# Patient Record
Sex: FEMALE | Race: WHITE | Employment: FULL TIME | URBAN - METROPOLITAN AREA
[De-identification: names, ages, dates, MRNs, and addresses within clinical notes are randomized per-mention and may not be internally consistent; named-entity substitution may affect disease eponyms.]

---

## 2024-01-03 LAB
ABO, EXTERNAL RESULT: NORMAL
C. TRACHOMATIS, EXTERNAL RESULT: NEGATIVE
HEP B, EXTERNAL RESULT: NEGATIVE
HIV, EXTERNAL RESULT: NON REACTIVE
N. GONORRHOEAE, EXTERNAL RESULT: NEGATIVE
RPR, EXTERNAL RESULT: NON REACTIVE
RUBELLA TITER, EXTERNAL RESULT: NORMAL

## 2024-01-04 LAB — GBS, EXTERNAL RESULT: POSITIVE

## 2024-01-21 ENCOUNTER — HOSPITAL ENCOUNTER (INPATIENT)
Facility: HOSPITAL | Age: 24
LOS: 4 days | Discharge: HOME | End: 2024-01-25
Attending: STUDENT IN AN ORGANIZED HEALTH CARE EDUCATION/TRAINING PROGRAM | Admitting: OBSTETRICS & GYNECOLOGY

## 2024-01-21 PROBLEM — Z3A.39 39 WEEKS GESTATION OF PREGNANCY: Status: ACTIVE | Noted: 2024-01-21

## 2024-01-21 LAB
ERYTHROCYTE [DISTWIDTH] IN BLOOD BY AUTOMATED COUNT: 12.3 % (ref 11.5–14.5)
HCT VFR BLD AUTO: 38 % (ref 35–47)
HGB BLD-MCNC: 12.7 G/DL (ref 11.5–16)
MCH RBC QN AUTO: 30.6 PG (ref 26–34)
MCHC RBC AUTO-ENTMCNC: 33.4 G/DL (ref 30–36.5)
MCV RBC AUTO: 91.6 FL (ref 80–99)
NRBC # BLD: 0 K/UL (ref 0–0.01)
NRBC BLD-RTO: 0 PER 100 WBC
PLATELET # BLD AUTO: 206 K/UL (ref 150–400)
PMV BLD AUTO: 9.2 FL (ref 8.9–12.9)
RBC # BLD AUTO: 4.15 M/UL (ref 3.8–5.2)
WBC # BLD AUTO: 11.2 K/UL (ref 3.6–11)

## 2024-01-21 PROCEDURE — 36415 COLL VENOUS BLD VENIPUNCTURE: CPT

## 2024-01-21 PROCEDURE — 1100000000 HC RM PRIVATE

## 2024-01-21 PROCEDURE — 85027 COMPLETE CBC AUTOMATED: CPT

## 2024-01-21 PROCEDURE — 6370000000 HC RX 637 (ALT 250 FOR IP): Performed by: ADVANCED PRACTICE MIDWIFE

## 2024-01-21 PROCEDURE — 7210000100 HC LABOR FEE PER 1 HR

## 2024-01-21 PROCEDURE — C1726 CATH, BAL DIL, NON-VASCULAR: HCPCS

## 2024-01-21 PROCEDURE — 59200 INSERT CERVICAL DILATOR: CPT

## 2024-01-21 RX ORDER — SODIUM CHLORIDE 9 MG/ML
25 INJECTION, SOLUTION INTRAVENOUS PRN
Status: DISCONTINUED | OUTPATIENT
Start: 2024-01-21 | End: 2024-01-23

## 2024-01-21 RX ORDER — CARBOPROST TROMETHAMINE 250 UG/ML
250 INJECTION, SOLUTION INTRAMUSCULAR PRN
Status: DISCONTINUED | OUTPATIENT
Start: 2024-01-21 | End: 2024-01-23

## 2024-01-21 RX ORDER — SODIUM CHLORIDE 0.9 % (FLUSH) 0.9 %
5-40 SYRINGE (ML) INJECTION EVERY 12 HOURS SCHEDULED
Status: DISCONTINUED | OUTPATIENT
Start: 2024-01-21 | End: 2024-01-23

## 2024-01-21 RX ORDER — ZOLPIDEM TARTRATE 5 MG/1
5 TABLET ORAL NIGHTLY PRN
Status: ACTIVE | OUTPATIENT
Start: 2024-01-21 | End: 2024-01-22

## 2024-01-21 RX ORDER — SODIUM CHLORIDE, SODIUM LACTATE, POTASSIUM CHLORIDE, CALCIUM CHLORIDE 600; 310; 30; 20 MG/100ML; MG/100ML; MG/100ML; MG/100ML
INJECTION, SOLUTION INTRAVENOUS CONTINUOUS
Status: DISCONTINUED | OUTPATIENT
Start: 2024-01-21 | End: 2024-01-23

## 2024-01-21 RX ORDER — SODIUM CHLORIDE 0.9 % (FLUSH) 0.9 %
5-40 SYRINGE (ML) INJECTION PRN
Status: DISCONTINUED | OUTPATIENT
Start: 2024-01-21 | End: 2024-01-23

## 2024-01-21 RX ORDER — CALCIUM CARBONATE 500(1250)
500 TABLET ORAL DAILY
Status: ON HOLD | COMMUNITY
End: 2024-01-24 | Stop reason: HOSPADM

## 2024-01-21 RX ORDER — METHYLERGONOVINE MALEATE 0.2 MG/ML
200 INJECTION INTRAVENOUS PRN
Status: DISCONTINUED | OUTPATIENT
Start: 2024-01-21 | End: 2024-01-23

## 2024-01-21 RX ORDER — ACETAMINOPHEN 325 MG/1
650 TABLET ORAL EVERY 4 HOURS PRN
Status: DISCONTINUED | OUTPATIENT
Start: 2024-01-21 | End: 2024-01-23

## 2024-01-21 RX ORDER — SODIUM CHLORIDE, SODIUM LACTATE, POTASSIUM CHLORIDE, AND CALCIUM CHLORIDE .6; .31; .03; .02 G/100ML; G/100ML; G/100ML; G/100ML
500 INJECTION, SOLUTION INTRAVENOUS PRN
Status: DISCONTINUED | OUTPATIENT
Start: 2024-01-21 | End: 2024-01-23

## 2024-01-21 RX ORDER — SEVOFLURANE 250 ML/250ML
1 LIQUID RESPIRATORY (INHALATION) CONTINUOUS PRN
Status: DISCONTINUED | OUTPATIENT
Start: 2024-01-21 | End: 2024-01-23

## 2024-01-21 RX ORDER — TERBUTALINE SULFATE 1 MG/ML
0.25 INJECTION, SOLUTION SUBCUTANEOUS
Status: ACTIVE | OUTPATIENT
Start: 2024-01-21 | End: 2024-01-22

## 2024-01-21 RX ORDER — HYDROMORPHONE HYDROCHLORIDE 1 MG/ML
1 INJECTION, SOLUTION INTRAMUSCULAR; INTRAVENOUS; SUBCUTANEOUS EVERY 4 HOURS PRN
Status: DISCONTINUED | OUTPATIENT
Start: 2024-01-21 | End: 2024-01-23

## 2024-01-21 RX ORDER — MISOPROSTOL 200 UG/1
800 TABLET ORAL PRN
Status: DISCONTINUED | OUTPATIENT
Start: 2024-01-21 | End: 2024-01-23

## 2024-01-21 RX ORDER — SODIUM CHLORIDE, SODIUM LACTATE, POTASSIUM CHLORIDE, AND CALCIUM CHLORIDE .6; .31; .03; .02 G/100ML; G/100ML; G/100ML; G/100ML
1000 INJECTION, SOLUTION INTRAVENOUS PRN
Status: DISCONTINUED | OUTPATIENT
Start: 2024-01-21 | End: 2024-01-23

## 2024-01-21 RX ADMIN — Medication 25 MCG: at 20:23

## 2024-01-21 ASSESSMENT — PAIN DESCRIPTION - LOCATION: LOCATION: ABDOMEN

## 2024-01-21 ASSESSMENT — PAIN DESCRIPTION - DESCRIPTORS: DESCRIPTORS: CRAMPING

## 2024-01-21 ASSESSMENT — PAIN SCALES - GENERAL
PAINLEVEL_OUTOF10: 1
PAINLEVEL_OUTOF10: 0
PAINLEVEL_OUTOF10: 0

## 2024-01-21 NOTE — H&P
positive  Depression- no meds  Transfer from [facility]      Plan:  Admit to L&D for scheduled IOL  IOL process reviewed in office and I also briefly reviewed cook/cytotec/pitocin r/b/a, lengthy IOL process and increased risk of c/s- patient consents to proceed with cook placement and IOL  Review and sign consents  CBC, T&S  Cook placed under speculum guidance 60mL (uterine), 60mL (vaginally)  Cytotec 25mcg q4h prn x2, hold if 3 or more contractions in 10 minutes  Pit @0400  IV pain meds/epidural prn  PCN for GBS prophylaxis at 0400 or sooner with labor  Continuous monitoring  Reviewed prenatal records    Reviewed plan with patient/mother, all questions asked/answered and they agree with plan    JOSUE Colbert - MALEAN  6:52 PM

## 2024-01-21 NOTE — PROGRESS NOTES
1702 pt arrived to labor and delivery for elective induction at 39.6 weeks.   1830 Mckenzie at bedside. Plan of care discussed and all questions answered. Cervical ripening balloon placed with ease. Pt tolerated well. Will assess for possible cytotec administration.

## 2024-01-22 ENCOUNTER — ANESTHESIA (OUTPATIENT)
Facility: HOSPITAL | Age: 24
End: 2024-01-22

## 2024-01-22 ENCOUNTER — ANESTHESIA EVENT (OUTPATIENT)
Facility: HOSPITAL | Age: 24
End: 2024-01-22

## 2024-01-22 PROCEDURE — 51701 INSERT BLADDER CATHETER: CPT

## 2024-01-22 PROCEDURE — 6360000002 HC RX W HCPCS: Performed by: ANESTHESIOLOGY

## 2024-01-22 PROCEDURE — 3700000156 HC EPIDURAL ANESTHESIA

## 2024-01-22 PROCEDURE — 2500000003 HC RX 250 WO HCPCS

## 2024-01-22 PROCEDURE — 2580000003 HC RX 258: Performed by: ADVANCED PRACTICE MIDWIFE

## 2024-01-22 PROCEDURE — 6360000002 HC RX W HCPCS: Performed by: ADVANCED PRACTICE MIDWIFE

## 2024-01-22 PROCEDURE — 3700000025 EPIDURAL BLOCK: Performed by: ANESTHESIOLOGY

## 2024-01-22 PROCEDURE — 2500000003 HC RX 250 WO HCPCS: Performed by: ANESTHESIOLOGY

## 2024-01-22 PROCEDURE — 1100000000 HC RM PRIVATE

## 2024-01-22 PROCEDURE — 2580000003 HC RX 258: Performed by: ANESTHESIOLOGY

## 2024-01-22 PROCEDURE — 7210000100 HC LABOR FEE PER 1 HR

## 2024-01-22 RX ORDER — NALOXONE HYDROCHLORIDE 0.4 MG/ML
INJECTION, SOLUTION INTRAMUSCULAR; INTRAVENOUS; SUBCUTANEOUS PRN
Status: DISCONTINUED | OUTPATIENT
Start: 2024-01-22 | End: 2024-01-23

## 2024-01-22 RX ORDER — FENTANYL CITRATE 50 UG/ML
INJECTION, SOLUTION INTRAMUSCULAR; INTRAVENOUS
Status: DISCONTINUED
Start: 2024-01-22 | End: 2024-01-23

## 2024-01-22 RX ORDER — ONDANSETRON 2 MG/ML
4 INJECTION INTRAMUSCULAR; INTRAVENOUS EVERY 6 HOURS PRN
Status: DISCONTINUED | OUTPATIENT
Start: 2024-01-22 | End: 2024-01-23

## 2024-01-22 RX ORDER — LIDOCAINE HCL/EPINEPHRINE/PF 2%-1:200K
VIAL (ML) INJECTION PRN
Status: DISCONTINUED | OUTPATIENT
Start: 2024-01-22 | End: 2024-01-23 | Stop reason: SDUPTHER

## 2024-01-22 RX ORDER — FENTANYL 0.2 MG/100ML-BUPIV 0.125%-NACL 0.9% EPIDURAL INJ 2/0.125 MCG/ML-%
1-15 SOLUTION INJECTION CONTINUOUS
Status: DISCONTINUED | OUTPATIENT
Start: 2024-01-22 | End: 2024-01-23

## 2024-01-22 RX ORDER — DIPHENHYDRAMINE HYDROCHLORIDE 50 MG/ML
12.5 INJECTION INTRAMUSCULAR; INTRAVENOUS EVERY 4 HOURS PRN
Status: DISCONTINUED | OUTPATIENT
Start: 2024-01-22 | End: 2024-01-23

## 2024-01-22 RX ORDER — ONDANSETRON 2 MG/ML
4 INJECTION INTRAMUSCULAR; INTRAVENOUS EVERY 6 HOURS PRN
Status: DISCONTINUED | OUTPATIENT
Start: 2024-01-22 | End: 2024-01-22 | Stop reason: SDUPTHER

## 2024-01-22 RX ORDER — FENTANYL CITRATE 50 UG/ML
INJECTION, SOLUTION INTRAMUSCULAR; INTRAVENOUS PRN
Status: DISCONTINUED | OUTPATIENT
Start: 2024-01-22 | End: 2024-01-23 | Stop reason: SDUPTHER

## 2024-01-22 RX ORDER — EPHEDRINE SULFATE 50 MG/ML
10 INJECTION INTRAVENOUS ONCE
Status: COMPLETED | OUTPATIENT
Start: 2024-01-22 | End: 2024-01-22

## 2024-01-22 RX ORDER — BUPIVACAINE HYDROCHLORIDE 5 MG/ML
INJECTION, SOLUTION EPIDURAL; INTRACAUDAL
Status: COMPLETED
Start: 2024-01-22 | End: 2024-01-22

## 2024-01-22 RX ORDER — EPHEDRINE SULFATE 50 MG/ML
INJECTION INTRAVENOUS
Status: COMPLETED
Start: 2024-01-22 | End: 2024-01-22

## 2024-01-22 RX ORDER — BUPIVACAINE HYDROCHLORIDE 5 MG/ML
INJECTION, SOLUTION EPIDURAL; INTRACAUDAL PRN
Status: DISCONTINUED | OUTPATIENT
Start: 2024-01-22 | End: 2024-01-23 | Stop reason: SDUPTHER

## 2024-01-22 RX ORDER — LIDOCAINE HCL/EPINEPHRINE/PF 2%-1:200K
VIAL (ML) INJECTION
Status: COMPLETED
Start: 2024-01-22 | End: 2024-01-22

## 2024-01-22 RX ADMIN — SODIUM CHLORIDE 5 MILLION UNITS: 900 INJECTION INTRAVENOUS at 04:17

## 2024-01-22 RX ADMIN — FENTANYL CITRATE 10 ML/HR: 0.05 INJECTION, SOLUTION INTRAMUSCULAR; INTRAVENOUS at 18:05

## 2024-01-22 RX ADMIN — PENICILLIN G POTASSIUM 2.5 MILLION UNITS: 20000000 INJECTION, POWDER, FOR SOLUTION INTRAVENOUS at 17:16

## 2024-01-22 RX ADMIN — PENICILLIN G POTASSIUM 2.5 MILLION UNITS: 20000000 INJECTION, POWDER, FOR SOLUTION INTRAVENOUS at 08:30

## 2024-01-22 RX ADMIN — EPHEDRINE SULFATE 10 MG: 50 INJECTION INTRAVENOUS at 19:16

## 2024-01-22 RX ADMIN — SODIUM CHLORIDE, POTASSIUM CHLORIDE, SODIUM LACTATE AND CALCIUM CHLORIDE: 600; 310; 30; 20 INJECTION, SOLUTION INTRAVENOUS at 13:35

## 2024-01-22 RX ADMIN — SODIUM CHLORIDE, POTASSIUM CHLORIDE, SODIUM LACTATE AND CALCIUM CHLORIDE: 600; 310; 30; 20 INJECTION, SOLUTION INTRAVENOUS at 16:33

## 2024-01-22 RX ADMIN — OXYTOCIN 1 MILLI-UNITS/MIN: 10 INJECTION, SOLUTION INTRAMUSCULAR; INTRAVENOUS at 04:24

## 2024-01-22 RX ADMIN — SODIUM CHLORIDE, POTASSIUM CHLORIDE, SODIUM LACTATE AND CALCIUM CHLORIDE: 600; 310; 30; 20 INJECTION, SOLUTION INTRAVENOUS at 17:07

## 2024-01-22 RX ADMIN — ONDANSETRON 4 MG: 2 INJECTION INTRAMUSCULAR; INTRAVENOUS at 00:35

## 2024-01-22 RX ADMIN — PENICILLIN G POTASSIUM 2.5 MILLION UNITS: 20000000 INJECTION, POWDER, FOR SOLUTION INTRAVENOUS at 12:30

## 2024-01-22 RX ADMIN — FENTANYL CITRATE 100 MCG: 50 INJECTION, SOLUTION INTRAMUSCULAR; INTRAVENOUS at 16:59

## 2024-01-22 RX ADMIN — PENICILLIN G POTASSIUM 2.5 MILLION UNITS: 20000000 INJECTION, POWDER, FOR SOLUTION INTRAVENOUS at 22:04

## 2024-01-22 RX ADMIN — LIDOCAINE HYDROCHLORIDE AND EPINEPHRINE 5 ML: 20; 5 INJECTION, SOLUTION EPIDURAL; INFILTRATION; INTRACAUDAL; PERINEURAL at 16:56

## 2024-01-22 RX ADMIN — SODIUM CHLORIDE, POTASSIUM CHLORIDE, SODIUM LACTATE AND CALCIUM CHLORIDE: 600; 310; 30; 20 INJECTION, SOLUTION INTRAVENOUS at 20:24

## 2024-01-22 RX ADMIN — SODIUM CHLORIDE, POTASSIUM CHLORIDE, SODIUM LACTATE AND CALCIUM CHLORIDE: 600; 310; 30; 20 INJECTION, SOLUTION INTRAVENOUS at 04:14

## 2024-01-22 RX ADMIN — BUPIVACAINE HYDROCHLORIDE 5 ML: 5 INJECTION, SOLUTION EPIDURAL; INTRACAUDAL; PERINEURAL at 16:59

## 2024-01-22 ASSESSMENT — PAIN SCALES - GENERAL: PAINLEVEL_OUTOF10: 0

## 2024-01-22 NOTE — PROGRESS NOTES
Labor Progress Note    S: Patient resting comfortably. Removed balloon about 30 minutes ago.      O:   Vitals:    24 0416   BP: (!) 94/49   Pulse: 79   Resp: 16   Temp: 97.9 °F (36.6 °C)   SpO2: 99%      FHT: 145 / moderate / + accels / no decels     [place]: q 2-5 minutes     SVE: deferred     24 yo  at 40w0d admitted for IOL for FGR (14% AC 9% at 38w).     FHT category 1  Sp abbey see   Pitocin at 6   GBS pos, PCN ordered   Will assess for AROM at next check    Tasha Amos MD

## 2024-01-22 NOTE — PROGRESS NOTES
0030 Received OB SBAR Report from BASHIR Ruano RN  0031 Patient called out for nausea   0035 Zofran given  0420 Patient sleeping  0734 Patient up to bathroom, cervical ripening balloon pulled out by patient

## 2024-01-22 NOTE — PROGRESS NOTES
Bedside and Verbal shift change report given to JANICE Lazo RN (oncoming nurse) by JOYCE Paige RN (offgoing nurse). Report included the following information Nurse Handoff Report, MAR, and Recent Results.   1005:  RN at bedside discussing position changes with pt and pt's mom  1258:  Dr Amos at bedside   SVE 4/60/-2  AROM    Pt sitting in throne  1350:  pt ambulating in hallway with mom  1415:  Pt in bed resting  1500:  Knee chest supported with cub in tberg   1550:  Pt sitting on toilet for 5-10 ctx  1603:  Pit off   1607:  Left side lying pelvic release  Right side lying pelvic release   1629:  SVE 5/90/-1  1656:  Dr Roland at bedside for epidural placement  1730  right side with peanut ball   1812:  left side with peanut ball  1925:  Right exaggerated runners  1935:  Bedside and Verbal shift change report given to THANH Ruano RN (oncoming nurse) by JANICE Lazo RN (offgoing nurse). Report included the following information Nurse Handoff Report, Intake/Output, MAR, and Recent Results.

## 2024-01-22 NOTE — ANESTHESIA PRE PROCEDURE
\"LABRH\"    Drug/Infectious Status (If Applicable):  No results found for: \"HIV\", \"HEPCAB\"    COVID-19 Screening (If Applicable): No results found for: \"COVID19\"        Anesthesia Evaluation  Patient summary reviewed and Nursing notes reviewed  Airway: Mallampati: II  TM distance: >3 FB   Neck ROM: full  Mouth opening: > = 3 FB   Dental: normal exam         Pulmonary:Negative Pulmonary ROS and normal exam                               Cardiovascular:Negative CV ROS  Exercise tolerance: good (>4 METS)                    Neuro/Psych:   Negative Neuro/Psych ROS              GI/Hepatic/Renal: Neg GI/Hepatic/Renal ROS            Endo/Other: Negative Endo/Other ROS                    Abdominal: normal exam            Vascular: negative vascular ROS.         Other Findings:             Anesthesia Plan      epidural     ASA 2             Anesthetic plan and risks discussed with patient.        Attending anesthesiologist reviewed and agrees with Preprocedure content                Ilia Roland MD   1/22/2024

## 2024-01-22 NOTE — PROGRESS NOTES
Labor Progress Note    S: Patient resting comfortably     O:   Vitals:    24 1232   BP: 101/63   Pulse: 70   Resp: 14   Temp: 97.5 °F (36.4 °C)   SpO2:       FHT: 140 / moderate / + accels / no decels     [facility]: q 2-4 minutes      SVE: 60/-2, AROM with clear fluid     22 yo  at 40w0d admitted for IOL for FGR (14% AC 9% at 38w)     FHT category 1   AROM with patient consent   GBS pos, continue PCN   Recheck 6hrs or PRN   Pain mgmt per patient request     Tasha Amos MD

## 2024-01-22 NOTE — ANESTHESIA PROCEDURE NOTES
Epidural Block    Patient location during procedure: OB  Start time: 1/22/2024 4:50 PM  End time: 1/22/2024 5:00 PM  Reason for block: labor epidural  Staffing  Performed: anesthesiologist   Anesthesiologist: Ilia Roland MD  Performed by: Ilia Roland MD  Authorized by: Ilia Roland MD    Epidural  Patient position: sitting  Prep: DuraPrep  Patient monitoring: cardiac monitor, continuous pulse ox and frequent blood pressure checks  Approach: midline  Location: L3-4  Injection technique: ZI air  Provider prep: mask and sterile gloves  Needle  Needle type: Tuohy   Needle gauge: 17 G  Needle length: 3.5 in  Needle insertion depth: 4 cm  Catheter type: end hole  Catheter size: 18 G  Catheter at skin depth: 8 cmCatheter Secured: tegaderm and tape  Assessment  Sensory level: T10  Events: None  Hemodynamics: stable  Attempts: 1  Outcomes: uncomplicated and patient tolerated procedure well  Preanesthetic Checklist  Completed: patient identified, IV checked, site marked, risks and benefits discussed, surgical/procedural consents, equipment checked, pre-op evaluation, timeout performed, anesthesia consent given, oxygen available, monitors applied/VS acknowledged and fire risk safety assessment completed and verbalized

## 2024-01-23 PROCEDURE — 7100000000 HC PACU RECOVERY - FIRST 15 MIN

## 2024-01-23 PROCEDURE — 7210000100 HC LABOR FEE PER 1 HR

## 2024-01-23 PROCEDURE — 7100000001 HC PACU RECOVERY - ADDTL 15 MIN

## 2024-01-23 PROCEDURE — 6360000002 HC RX W HCPCS: Performed by: ANESTHESIOLOGY

## 2024-01-23 PROCEDURE — 1120000000 HC RM PRIVATE OB

## 2024-01-23 PROCEDURE — 6360000002 HC RX W HCPCS: Performed by: ADVANCED PRACTICE MIDWIFE

## 2024-01-23 PROCEDURE — 7220000101 HC DELIVERY VAGINAL/SINGLE

## 2024-01-23 PROCEDURE — 2580000003 HC RX 258: Performed by: ANESTHESIOLOGY

## 2024-01-23 PROCEDURE — 6370000000 HC RX 637 (ALT 250 FOR IP): Performed by: MIDWIFE

## 2024-01-23 PROCEDURE — 2580000003 HC RX 258: Performed by: ADVANCED PRACTICE MIDWIFE

## 2024-01-23 RX ORDER — MODIFIED LANOLIN
OINTMENT (GRAM) TOPICAL PRN
Status: DISCONTINUED | OUTPATIENT
Start: 2024-01-23 | End: 2024-01-25 | Stop reason: HOSPADM

## 2024-01-23 RX ORDER — SODIUM CHLORIDE, SODIUM LACTATE, POTASSIUM CHLORIDE, CALCIUM CHLORIDE 600; 310; 30; 20 MG/100ML; MG/100ML; MG/100ML; MG/100ML
INJECTION, SOLUTION INTRAVENOUS CONTINUOUS
Status: DISCONTINUED | OUTPATIENT
Start: 2024-01-23 | End: 2024-01-25 | Stop reason: HOSPADM

## 2024-01-23 RX ORDER — IBUPROFEN 400 MG/1
800 TABLET ORAL EVERY 8 HOURS SCHEDULED
Status: DISCONTINUED | OUTPATIENT
Start: 2024-01-23 | End: 2024-01-25 | Stop reason: HOSPADM

## 2024-01-23 RX ORDER — SODIUM CHLORIDE 0.9 % (FLUSH) 0.9 %
5-40 SYRINGE (ML) INJECTION PRN
Status: DISCONTINUED | OUTPATIENT
Start: 2024-01-23 | End: 2024-01-25 | Stop reason: HOSPADM

## 2024-01-23 RX ORDER — SODIUM CHLORIDE 0.9 % (FLUSH) 0.9 %
5-40 SYRINGE (ML) INJECTION EVERY 12 HOURS SCHEDULED
Status: DISCONTINUED | OUTPATIENT
Start: 2024-01-23 | End: 2024-01-25 | Stop reason: HOSPADM

## 2024-01-23 RX ORDER — DOCUSATE SODIUM 100 MG/1
100 CAPSULE, LIQUID FILLED ORAL 2 TIMES DAILY
Status: DISCONTINUED | OUTPATIENT
Start: 2024-01-23 | End: 2024-01-25 | Stop reason: HOSPADM

## 2024-01-23 RX ORDER — SODIUM CHLORIDE 9 MG/ML
INJECTION, SOLUTION INTRAVENOUS PRN
Status: DISCONTINUED | OUTPATIENT
Start: 2024-01-23 | End: 2024-01-25 | Stop reason: HOSPADM

## 2024-01-23 RX ORDER — ACETAMINOPHEN 500 MG
1000 TABLET ORAL EVERY 8 HOURS SCHEDULED
Status: DISCONTINUED | OUTPATIENT
Start: 2024-01-23 | End: 2024-01-25 | Stop reason: HOSPADM

## 2024-01-23 RX ADMIN — IBUPROFEN 800 MG: 400 TABLET, FILM COATED ORAL at 20:27

## 2024-01-23 RX ADMIN — ACETAMINOPHEN 1000 MG: 500 TABLET ORAL at 20:26

## 2024-01-23 RX ADMIN — OXYTOCIN 1 MILLI-UNITS/MIN: 10 INJECTION, SOLUTION INTRAMUSCULAR; INTRAVENOUS at 02:11

## 2024-01-23 RX ADMIN — Medication 166.7 ML: at 06:23

## 2024-01-23 RX ADMIN — IBUPROFEN 800 MG: 400 TABLET, FILM COATED ORAL at 12:12

## 2024-01-23 RX ADMIN — ACETAMINOPHEN 1000 MG: 500 TABLET ORAL at 12:12

## 2024-01-23 RX ADMIN — PENICILLIN G POTASSIUM 2.5 MILLION UNITS: 20000000 INJECTION, POWDER, FOR SOLUTION INTRAVENOUS at 02:14

## 2024-01-23 RX ADMIN — OXYTOCIN 87.3 MILLI-UNITS/MIN: 10 INJECTION, SOLUTION INTRAMUSCULAR; INTRAVENOUS at 06:33

## 2024-01-23 RX ADMIN — DOCUSATE SODIUM 100 MG: 100 CAPSULE, LIQUID FILLED ORAL at 20:27

## 2024-01-23 RX ADMIN — FENTANYL CITRATE 10 ML/HR: 0.05 INJECTION, SOLUTION INTRAMUSCULAR; INTRAVENOUS at 02:45

## 2024-01-23 RX ADMIN — SODIUM CHLORIDE, POTASSIUM CHLORIDE, SODIUM LACTATE AND CALCIUM CHLORIDE: 600; 310; 30; 20 INJECTION, SOLUTION INTRAVENOUS at 03:37

## 2024-01-23 RX ADMIN — PENICILLIN G POTASSIUM 2.5 MILLION UNITS: 20000000 INJECTION, POWDER, FOR SOLUTION INTRAVENOUS at 05:46

## 2024-01-23 ASSESSMENT — PAIN SCALES - GENERAL: PAINLEVEL_OUTOF10: 0

## 2024-01-23 NOTE — DISCHARGE INSTRUCTIONS
1/24/2024 Time: 11:34 AM      Postpartum Support Groups  We know that all of us are dealing with a tremendous amount of uncertainty, confusion and disruption to our daily lives, which may result in increased anxiety, depression and fear. If you are feeling unsettled or worse, please know that we are here to help. During this time of increased caution and care for one another, [facility] [place] ([facility]) is offering virtual support groups to ALL MOTHERS in [place] regardless of the age of your child/children as a way to help weather this emotional storm together. Social support is an important part of self-care during this time of physical distancing.  Virtual postpartum support group meetings available at www.postpartumva.org  Warm Line: 327.968.6189    Breastfeeding Support Groups   1st and 3rd Wednesday of each month at [place]  2nd and 4th Tuesday of each month at [place]      [email or web address]

## 2024-01-23 NOTE — ANESTHESIA POSTPROCEDURE EVALUATION
Department of Anesthesiology  Postprocedure Note    Patient: Mary Lutz  MRN: [id number]  YOB: 2000  Date of evaluation: 1/23/2024    Procedure Summary       Date: 01/22/24 Room / Location:     Anesthesia Start: 1650 Anesthesia Stop: 01/23/24 0618    Procedure: Labor Analgesia Diagnosis:     Scheduled Providers:  Responsible Provider: Marcus Snell DO    Anesthesia Type: epidural ASA Status: 2            Anesthesia Type: No value filed.    Louisa Phase I:      Louisa Phase II:      Anesthesia Post Evaluation    Patient location during evaluation: bedside  Level of consciousness: awake  Pain score: 0  Airway patency: patent  Nausea & Vomiting: no nausea  Cardiovascular status: hemodynamically stable  Respiratory status: acceptable  Hydration status: euvolemic  Comments: BP: 90/52 Pulse: 70  Resp: 16 SpO2: 99  Temp: 97.9 °F (36.6 °C)      Pain management: adequate    No notable events documented.

## 2024-01-23 NOTE — PROGRESS NOTES
Called by nursing to evaluate EFM tracing. Contractions remain unable to correctly monitor by EFM and questionable variable decels vs. Late decels currently.     VSS    VE: /-2. Vertex  IUPC placed. Straight cath by RN staff.     Assessment: cat II tracing.     Plan: Continue fetal surveillance. Fetal resuscitation maneuvers prn. Consider amnioinfusion if variables persist.   Anticipate

## 2024-01-23 NOTE — PROGRESS NOTES
In room to introduce myself to pt. Resting comfortably with working epidural. Pitocin at 4mu. Mother of pt. At BS.     VSS    FHR 120bpm. Mod gualberto. 15x15 accels. Occasional gualberto decels noted.   Ctx TOCO somewhat indeterminate for periods. Abdomen palpates soft. No tachysystole noted when monitoring Ucs.     Assessment: IUP @ 40w. IOL for suspected IUGR. Cat I Itracing.     Plan: Continue labor induction. VE in next couple hours or if tracing warrants. May consider IUPC

## 2024-01-23 NOTE — PROGRESS NOTES
Amnioinfusion previously initiated and called by nursing that patient's EFM persists with decels. In room to evaluate. Just out of trendelenburg. Moderate amount of blood show. Pitocin has been off for approx. 1hr    VSS    VE: no change. Remains 6/90/-2.     Assessment: persistent Cat II tracing. Intermittent late decelerations    Plan: Lengthy discussion with pt. And her mother on FHR tracing and inability to run pitocin if decels persist, functionality of the placenta, also inability to remain ruptured without labor contractions indefinitely. Discussed tracing with Dr. Brown. Appears to be improvement now. Currently Cat I. Will continue to monitor and hope to be able to resume pitocin

## 2024-01-23 NOTE — L&D DELIVERY NOTE
Delivery of female infant over supported perineum. Vertex delivered with spont. Maternal pushing efforts. No nuchal noted. Anterior shoulder delivered with gentle downward traction followed immediately by post. Shoulder and body. Baby placed on mom's abdomen. Dried and stimulated/bulb suction by RN staff. Delayed cord clamping. Cord clamped by CNM and cut by pt. Infant skin to skin. Repair in typical running fashion for good hemostasis. Kashif placenta del. Intact. Mom and baby stable and attended after sharif care      KIT Lutz [[id number]]      Labor Events     Labor: No   Steroids: None  Cervical Ripening Date/Time:  24 18:30:00   Cervical Ripening Type: Whitley/EASI, Misoprostol  Antibiotics Received during Labor: Yes  Rupture Date/Time:  24 13:02:00   Rupture Type: Intact  Fluid Color: Clear  Fluid Odor: None  Fluid Volume: Moderate  Labor Complications: None              Anesthesia    Method: Epidural       Labor Event Times      Labor onset date/time:        Dilation complete date/time:  24 05:02:00     Start pushing date/time:  2024 05:10:00   Decision date/time (emergent ):            Labor Length    2nd stage: 1h 16m  3rd stage: 0h 04m       Delivery Details      Delivery Date: 24 Delivery Time: 06:18:00   Delivery Type: Vaginal, Spontaneous               Presentation    Presentation: Vertex       Shoulder Dystocia    Shoulder Dystocia Present?: No       Assisted Delivery Details    Forceps Attempted?: No  Vacuum Extractor Attempted?: No                           Cord    Vessels: 3 Vessels  Complications: None  Delayed Cord Clamping?: Yes  Cord Clamped Date/Time: 2024 06:20:00  Cord Blood Disposition: Discard  Gases Sent?: No              Placenta    Date/Time: 2024 06:22:00  Removal: Spontaneous  Appearance: Intact  Disposition: Discarded       Lacerations    Perineal Lacerations: 2nd  Number of Repair Packets: 1       Vaginal

## 2024-01-23 NOTE — LACTATION NOTE
This note was copied from a baby's chart.  Initial Lactation Consultation - Baby born vaginally this morning to a  mom at 40 1/7 weeks gestation. Mom noticed breast changes during her pregnancy. Mom states baby has been sleepy today. I helped mom with a feeding this afternoon. We were able to get baby latched deeply on both breasts in the cross cradle hold. Baby was sucking rhythmically with frequent, audible swallows.     Mom will continue to feed the baby according to her feeding cues.she will not limit the time the baby is at the breast and she will offer both breasts at each feeding.

## 2024-01-24 PROCEDURE — 6370000000 HC RX 637 (ALT 250 FOR IP): Performed by: MIDWIFE

## 2024-01-24 PROCEDURE — 10H07YZ INSERTION OF OTHER DEVICE INTO PRODUCTS OF CONCEPTION, VIA NATURAL OR ARTIFICIAL OPENING: ICD-10-PCS | Performed by: OBSTETRICS & GYNECOLOGY

## 2024-01-24 PROCEDURE — 6370000000 HC RX 637 (ALT 250 FOR IP): Performed by: OBSTETRICS & GYNECOLOGY

## 2024-01-24 PROCEDURE — 3E033VJ INTRODUCTION OF OTHER HORMONE INTO PERIPHERAL VEIN, PERCUTANEOUS APPROACH: ICD-10-PCS | Performed by: OBSTETRICS & GYNECOLOGY

## 2024-01-24 PROCEDURE — 1120000000 HC RM PRIVATE OB

## 2024-01-24 PROCEDURE — 3E0P7VZ INTRODUCTION OF HORMONE INTO FEMALE REPRODUCTIVE, VIA NATURAL OR ARTIFICIAL OPENING: ICD-10-PCS | Performed by: OBSTETRICS & GYNECOLOGY

## 2024-01-24 PROCEDURE — 00HU33Z INSERTION OF INFUSION DEVICE INTO SPINAL CANAL, PERCUTANEOUS APPROACH: ICD-10-PCS | Performed by: OBSTETRICS & GYNECOLOGY

## 2024-01-24 PROCEDURE — 0KQM0ZZ REPAIR PERINEUM MUSCLE, OPEN APPROACH: ICD-10-PCS | Performed by: OBSTETRICS & GYNECOLOGY

## 2024-01-24 RX ORDER — SIMETHICONE 80 MG
80 TABLET,CHEWABLE ORAL EVERY 6 HOURS PRN
Status: DISCONTINUED | OUTPATIENT
Start: 2024-01-24 | End: 2024-01-25 | Stop reason: HOSPADM

## 2024-01-24 RX ORDER — IBUPROFEN 800 MG/1
800 TABLET ORAL EVERY 8 HOURS PRN
Qty: 40 TABLET | Refills: 0 | Status: SHIPPED | OUTPATIENT
Start: 2024-01-24

## 2024-01-24 RX ADMIN — ACETAMINOPHEN 1000 MG: 500 TABLET ORAL at 05:27

## 2024-01-24 RX ADMIN — ACETAMINOPHEN 1000 MG: 500 TABLET ORAL at 13:04

## 2024-01-24 RX ADMIN — ACETAMINOPHEN 1000 MG: 500 TABLET ORAL at 21:45

## 2024-01-24 RX ADMIN — DOCUSATE SODIUM 100 MG: 100 CAPSULE, LIQUID FILLED ORAL at 09:10

## 2024-01-24 RX ADMIN — SIMETHICONE 80 MG: 80 TABLET, CHEWABLE ORAL at 14:59

## 2024-01-24 RX ADMIN — IBUPROFEN 800 MG: 400 TABLET, FILM COATED ORAL at 05:27

## 2024-01-24 RX ADMIN — DOCUSATE SODIUM 100 MG: 100 CAPSULE, LIQUID FILLED ORAL at 21:45

## 2024-01-24 RX ADMIN — IBUPROFEN 800 MG: 400 TABLET, FILM COATED ORAL at 21:44

## 2024-01-24 RX ADMIN — IBUPROFEN 800 MG: 400 TABLET, FILM COATED ORAL at 13:04

## 2024-01-24 ASSESSMENT — PAIN SCALES - GENERAL
PAINLEVEL_OUTOF10: 3
PAINLEVEL_OUTOF10: 3

## 2024-01-24 ASSESSMENT — PAIN DESCRIPTION - ORIENTATION: ORIENTATION: LOWER

## 2024-01-24 ASSESSMENT — PAIN DESCRIPTION - DESCRIPTORS: DESCRIPTORS: SORE

## 2024-01-24 ASSESSMENT — PAIN DESCRIPTION - LOCATION: LOCATION: BACK

## 2024-01-24 NOTE — LACTATION NOTE
This note was copied from a baby's chart.  Mom in pain with breastfeeding for the duration of the feed despite relatching. Nipples show abrasions upon release.Provided mom with a nipple shield and mom states the latch is much more comfortable. Occasional swallows noted as infant nurses. Infant weight loss at 24 hours was 7.4%. Nurse to reweigh infant following current nursing session.

## 2024-01-24 NOTE — PROGRESS NOTES
Post-Partum Day Number 1 Progress Note    Mary Lutz     Assessment:   Doing well, post partum day 1    Plan:  - Continue routine postpartum and perineal care as well as maternal education.  - N/A   - Plan discharge home tomorrow.    Information for the patient's :  KIT Lutz [[id number]]   Vaginal, Spontaneous  Patient doing well without significant complaint.  Voiding without difficulty, normal lochia.    Vitals:  /63   Pulse 66   Temp 98 °F (36.7 °C) (Oral)   Resp 16   SpO2 98%   Breastfeeding Unknown   Temp (24hrs), Av.9 °F (36.6 °C), Min:97.7 °F (36.5 °C), Max:98 °F (36.7 °C)        Exam:   Patient without distress.                  Fundus firm, nontender per nursing fundal checks.                Perineum with normal lochia noted per nursing assessment.                Lower extremities are negative for pathological edema.    Labs:     Lab Results   Component Value Date/Time    WBC 11.2 2024 05:52 PM    HGB 12.7 2024 05:52 PM    HCT 38.0 2024 05:52 PM     2024 05:52 PM       No results found for this or any previous visit (from the past 24 hour(s)).

## 2024-01-24 NOTE — DISCHARGE SUMMARY
Obstetrical Discharge Summary     Name: Mary Lutz MRN: [id number]  SSN: [id number]    YOB: 2000  Age: 23 y.o.  Sex: female      Admit Date: 2024    Discharge Date: 2024     Admitting Physician: Madeline Rodriguez MD     Attending Physician:  Tasha Amos MD     Admission Diagnoses: 39 weeks gestation of pregnancy [Z3A.39]    Discharge Diagnoses:   Information for the patient's :  KIT Lutz [[id number]]   @[id number]@      Additional Diagnoses:  No components found for: \"OBEXTABORH\", \"OBEXTABSCRN\", \"OBEXTRUBELLA\", \"OBEXTGRBS\"    Hospital Course: Normal hospital course following the delivery.    Patient Instructions:   Current Discharge Medication List        START taking these medications    Details   ibuprofen (ADVIL;MOTRIN) 800 MG tablet Take 1 tablet by mouth every 8 hours as needed for Pain  Qty: 40 tablet, Refills: 0           CONTINUE these medications which have NOT CHANGED    Details   Prenatal Multivit-Min-Fe-FA (PRENATAL 1 + IRON PO) Take by mouth           STOP taking these medications       calcium carbonate (OSCAL) 500 MG TABS tablet Comments:   Reason for Stopping:               Disposition at Discharge: Home or self care    Condition at Discharge: Stable    Reference my discharge instructions.    No follow-ups on file.     Signed By:  Sindi Ramos MD     2024

## 2024-01-25 VITALS
HEART RATE: 80 BPM | RESPIRATION RATE: 16 BRPM | DIASTOLIC BLOOD PRESSURE: 75 MMHG | TEMPERATURE: 97.7 F | OXYGEN SATURATION: 98 % | SYSTOLIC BLOOD PRESSURE: 119 MMHG

## 2024-01-25 PROCEDURE — 6370000000 HC RX 637 (ALT 250 FOR IP): Performed by: MIDWIFE

## 2024-01-25 RX ADMIN — IBUPROFEN 800 MG: 400 TABLET, FILM COATED ORAL at 06:19

## 2024-01-25 RX ADMIN — DOCUSATE SODIUM 100 MG: 100 CAPSULE, LIQUID FILLED ORAL at 08:38

## 2024-01-25 RX ADMIN — ACETAMINOPHEN 1000 MG: 500 TABLET ORAL at 06:19

## 2024-01-25 ASSESSMENT — PAIN DESCRIPTION - ORIENTATION: ORIENTATION: LOWER

## 2024-01-25 ASSESSMENT — PAIN DESCRIPTION - LOCATION: LOCATION: ABDOMEN

## 2024-01-25 ASSESSMENT — PAIN SCALES - GENERAL: PAINLEVEL_OUTOF10: 2

## 2024-01-25 ASSESSMENT — PAIN - FUNCTIONAL ASSESSMENT: PAIN_FUNCTIONAL_ASSESSMENT: ACTIVITIES ARE NOT PREVENTED

## 2024-01-25 ASSESSMENT — PAIN DESCRIPTION - DESCRIPTORS: DESCRIPTORS: SORE

## 2024-01-25 NOTE — CARE COORDINATION
CM consult placed for questions regarding insurance. CM met with patient and her mother at bedside. Patient lives in [place] with her , but is from the [place]. She returned to give birth and has questions about insurance and Medicaid. CM provided information regarding applying for Medicaid, as well as a [facility] financial assistance application. CM recommended that patient schedule babies  visit with a [facility] practice. CM provided the contact information for the [place] location. Family is also familiar with [facility]. Patient and baby will be returning to [place] in about one month. There were no other concerns at this time.    Maureen Cobian, [facility]  Care Management [facility]  [phone]

## 2024-01-25 NOTE — LACTATION NOTE
This note was copied from a baby's chart.  Mother is breastfeeding with shield and offering supplement of donor milk/EBM.  Mother will continue to supplement at home with formula until follow up and milk is in.  Mother has no further questions for lactation consultant before discharge.

## 2024-01-25 NOTE — PROGRESS NOTES
Post-Partum Day Number 2 Progress Note    Mary Lutz     Assessment: Doing well, post partum day 2    Plan:   - Discharge home today  - Follow up in office in 6 week(s) with [facility].  - Pain medication prescription(s) sent.  - Questions answered.    Information for the patient's :  KIT Lutz [[id number]]   Vaginal, Spontaneous  Patient doing well without significant complaint.  Voiding without difficulty, normal lochia. Ready for discharge home.    Vitals:  /69   Pulse 82   Temp 97.7 °F (36.5 °C) (Oral)   Resp 16   SpO2 98%   Breastfeeding Unknown   Temp (24hrs), Av.7 °F (36.5 °C), Min:97.5 °F (36.4 °C), Max:97.8 °F (36.6 °C)      Exam:        Patient without distress.                Fundus firm, nontender per nursing fundal checks                Perineum with normal lochia noted per nursing assessment                Lower extremities are negative for pathological edema    Labs:     Lab Results   Component Value Date/Time    WBC 11.2 2024 05:52 PM    HGB 12.7 2024 05:52 PM    HCT 38.0 2024 05:52 PM     2024 05:52 PM       No results found for this or any previous visit (from the past 24 hour(s)).